# Patient Record
Sex: FEMALE | Race: ASIAN | NOT HISPANIC OR LATINO | URBAN - METROPOLITAN AREA
[De-identification: names, ages, dates, MRNs, and addresses within clinical notes are randomized per-mention and may not be internally consistent; named-entity substitution may affect disease eponyms.]

---

## 2019-03-20 ENCOUNTER — OFFICE (OUTPATIENT)
Dept: URBAN - METROPOLITAN AREA CLINIC 45 | Facility: CLINIC | Age: 49
End: 2019-03-20

## 2019-03-20 VITALS
HEIGHT: 60 IN | DIASTOLIC BLOOD PRESSURE: 61 MMHG | HEART RATE: 89 BPM | SYSTOLIC BLOOD PRESSURE: 103 MMHG | WEIGHT: 104 LBS

## 2019-03-20 DIAGNOSIS — R19.5 STOOL FLECKED WITH BLOOD: ICD-10-CM

## 2019-03-20 DIAGNOSIS — K62.89 PAIN, ANAL/RECTAL: ICD-10-CM

## 2019-03-20 DIAGNOSIS — K59.09 CONSTIPATION, OTHER: ICD-10-CM

## 2019-03-20 PROCEDURE — 99203 OFFICE O/P NEW LOW 30 MIN: CPT | Performed by: INTERNAL MEDICINE

## 2019-03-20 NOTE — SERVICEHPINOTES
The patient complains of discomfort/pain in the anorectal area.   Symptoms started   several  years   ago.   It is a   mild and moderate  ,  sharp and dull  ,   pain.    It is sensed by the patient in   perineum and anal verge   area.  It radiates to the   sacrum and back  .   Pain lasts   .   Associated symptoms include   .   Prior pertinent anorectal conditions for this patient include   .   The patient has previously had     for the treatment of related disease.    Patient has long-standing back pain problems.  She has been previously treated by pain management specialist.  Her symptoms recently prompted her primary care physician to proceed with CT scan of the abdomen, which reportedly described no major pathology. She now presents for discussion about colonoscopy. There has been no previous colon screening. The patient has no family history of colon cancer or other significant GI diseases. Patient reports intermittent constipation and small amount of bright red blood on toilet tissue. She denies fever, nausea, vomiting, dysphagia, reflux, abdominal pain, change in bowel habits, melena, and significant change in weight. Denies shortness of breath and chest pain.

## 2019-04-05 ENCOUNTER — AMBULATORY SURGICAL CENTER (OUTPATIENT)
Dept: URBAN - METROPOLITAN AREA SURGERY 28 | Facility: SURGERY | Age: 49
End: 2019-04-05

## 2019-04-05 VITALS
WEIGHT: 104 LBS | DIASTOLIC BLOOD PRESSURE: 66 MMHG | SYSTOLIC BLOOD PRESSURE: 98 MMHG | HEART RATE: 81 BPM | TEMPERATURE: 98.1 F | HEIGHT: 60 IN | OXYGEN SATURATION: 99 % | RESPIRATION RATE: 16 BRPM

## 2019-04-05 DIAGNOSIS — K59.09 OTHER CONSTIPATION: ICD-10-CM

## 2019-04-05 DIAGNOSIS — D12.5 BENIGN NEOPLASM OF SIGMOID COLON: ICD-10-CM

## 2019-04-05 DIAGNOSIS — K62.89 OTHER SPECIFIED DISEASES OF ANUS AND RECTUM: ICD-10-CM

## 2019-04-05 DIAGNOSIS — R19.5 OTHER FECAL ABNORMALITIES: ICD-10-CM

## 2019-04-05 DIAGNOSIS — D12.3 BENIGN NEOPLASM OF TRANSVERSE COLON: ICD-10-CM

## 2019-04-05 LAB — SURGICAL: PDF REPORT: (no result)

## 2019-04-05 PROCEDURE — 45380 COLONOSCOPY AND BIOPSY: CPT | Performed by: INTERNAL MEDICINE

## 2019-04-05 NOTE — SERVICEHPINOTES
The patient complains of discomfort/pain in the anorectal area. Symptoms started several years ago. It is a mild and moderate, sharp and dull, pain. It is sensed by the patient in perineum and anal verge area. It radiates to the sacrum and back. Patient has long-standing back pain problems. She has been previously treated by pain management specialist. Her symptoms recently prompted her primary care physician to proceed with CT scan of the abdomen, which reportedly described no major pathology. She now presents for discussion about colonoscopy. There has been no previous colon screening. The patient has no family history of colon cancer or other significant GI diseases. Patient reports intermittent constipation and small amount of bright red blood on toilet tissue. She denies fever, nausea, vomiting, dysphagia, reflux, abdominal pain, change in bowel habits, melena, and significant change in weight. Denies shortness of breath and chest pain.

## 2022-10-17 ENCOUNTER — HOSPITAL ENCOUNTER (EMERGENCY)
Dept: HOSPITAL 54 - ER | Age: 52
Discharge: HOME | End: 2022-10-17
Payer: COMMERCIAL

## 2022-10-17 VITALS — WEIGHT: 116 LBS | HEIGHT: 59 IN | BODY MASS INDEX: 23.39 KG/M2

## 2022-10-17 VITALS — DIASTOLIC BLOOD PRESSURE: 62 MMHG | SYSTOLIC BLOOD PRESSURE: 106 MMHG

## 2022-10-17 DIAGNOSIS — R10.2: Primary | ICD-10-CM

## 2022-10-17 DIAGNOSIS — Z90.89: ICD-10-CM

## 2022-10-17 LAB
ALBUMIN SERPL BCP-MCNC: 4.1 G/DL (ref 3.4–5)
ALP SERPL-CCNC: 70 U/L (ref 46–116)
ALT SERPL W P-5'-P-CCNC: 23 U/L (ref 12–78)
AST SERPL W P-5'-P-CCNC: 11 U/L (ref 15–37)
BASOPHILS # BLD AUTO: 0 K/UL (ref 0–0.2)
BASOPHILS NFR BLD AUTO: 0.4 % (ref 0–2)
BILIRUB DIRECT SERPL-MCNC: 0.1 MG/DL (ref 0–0.2)
BILIRUB SERPL-MCNC: 0.4 MG/DL (ref 0.2–1)
BILIRUB UR QL STRIP: NEGATIVE
BUN SERPL-MCNC: 15 MG/DL (ref 7–18)
CALCIUM SERPL-MCNC: 9.1 MG/DL (ref 8.5–10.1)
CHLORIDE SERPL-SCNC: 102 MMOL/L (ref 98–107)
CO2 SERPL-SCNC: 34 MMOL/L (ref 21–32)
COLOR UR: YELLOW
CREAT SERPL-MCNC: 1.1 MG/DL (ref 0.6–1.3)
DEPRECATED SQUAMOUS URNS QL MICRO: (no result) /HPF
EOSINOPHIL NFR BLD AUTO: 1.3 % (ref 0–6)
GLUCOSE SERPL-MCNC: 83 MG/DL (ref 74–106)
GLUCOSE UR STRIP-MCNC: NEGATIVE MG/DL
HCT VFR BLD AUTO: 42 % (ref 33–45)
HGB BLD-MCNC: 13.7 G/DL (ref 11.5–14.8)
LEUKOCYTE ESTERASE UR QL STRIP: NEGATIVE
LYMPHOCYTES NFR BLD AUTO: 2.4 K/UL (ref 0.8–4.8)
LYMPHOCYTES NFR BLD AUTO: 33.7 % (ref 20–44)
MCHC RBC AUTO-ENTMCNC: 33 G/DL (ref 31–36)
MCV RBC AUTO: 90 FL (ref 82–100)
MONOCYTES NFR BLD AUTO: 0.7 K/UL (ref 0.1–1.3)
MONOCYTES NFR BLD AUTO: 10.3 % (ref 2–12)
NEUTROPHILS # BLD AUTO: 3.8 K/UL (ref 1.8–8.9)
NEUTROPHILS NFR BLD AUTO: 54.3 % (ref 43–81)
NITRITE UR QL STRIP: NEGATIVE
PH UR STRIP: 6 [PH] (ref 5–8)
PLATELET # BLD AUTO: 248 K/UL (ref 150–450)
POTASSIUM SERPL-SCNC: 3.6 MMOL/L (ref 3.5–5.1)
PROT SERPL-MCNC: 7.8 G/DL (ref 6.4–8.2)
PROT UR QL STRIP: NEGATIVE MG/DL
RBC # BLD AUTO: 4.62 MIL/UL (ref 4–5.2)
RBC #/AREA URNS HPF: (no result) /HPF (ref 0–2)
SODIUM SERPL-SCNC: 138 MMOL/L (ref 136–145)
UROBILINOGEN UR STRIP-MCNC: 0.2 EU/DL
WBC #/AREA URNS HPF: (no result) /HPF (ref 0–3)
WBC NRBC COR # BLD AUTO: 7.1 K/UL (ref 4.3–11)

## 2022-10-17 NOTE — NUR
BIBS STATING SHE HAD A CT WITH ABNORMALITIES AND PCP REFFERED HER TO GET A 
PELVIC ULTRASOUND AND D-DIMER. PT STATED THAT SHE INITALLY WENT TO HER PCP 
BECAUSE SHE HAD CHEST PRESSURE THAT RADIATED TO HER LOWER STOMACH FOR 2 WEEKS. 
DENIES ANY RECENT TRAVEL. VITALS ARE WITHIN NORMAL LIMITS. AWAITING MD MUSA.

## 2022-11-14 ENCOUNTER — OFFICE (OUTPATIENT)
Dept: URBAN - METROPOLITAN AREA CLINIC 45 | Facility: CLINIC | Age: 52
End: 2022-11-14

## 2022-11-14 VITALS — HEIGHT: 60 IN

## 2022-11-14 DIAGNOSIS — Z86.010 PERSONAL HISTORY COLON POLYPS: ICD-10-CM

## 2022-11-14 DIAGNOSIS — R10.2 PELVIC PAIN: ICD-10-CM

## 2022-11-14 PROCEDURE — 99203 OFFICE O/P NEW LOW 30 MIN: CPT | Mod: 95 | Performed by: INTERNAL MEDICINE

## 2022-11-14 NOTE — SERVICEHPINOTES
The patient is scheduled today for a telehealth visit, due to current mandate for social distancing on account of the COVID-19 Pandemic. The clinician is connected to a secure network. The patient consents to this teleconference being a billable service.   This visit used [email or web address] for a live, interactive audio and video telehealth visit.   The patient complains of abdominal pain.    Symptoms began   several  month   ago with onset that was    abrupt  .    It is localized as   left lower quadrant   pain.    It is described as   moderate   in nature.   Pain quality is described as   crampy and dull  .    It also radiates to the   pelvic  .    Discomfort typically lasts   several  hours   and has a course which is   steady  .   It usually starts   abruptly  .    Symptom triggers include   nothing specific  .  Alleviating factors include   positional change/pressure  .    Symptoms have been    since onset.    Alarm features noted:   .   The patient also reports   .      Symptoms have caused the patient to   see primary care physician   Similar symptoms   have not   occurred in the past, associated with    pelvic floor issues  .   Noteworthy prior abdominal interventions include   .   Off note pain may occur up to 2-3 weeks apart  COLONOSCOPY 2019   has been performed previously to evaluate the patient's symptoms.   Remedies tried to date include    Tylenol   with    some benefit  .    Other pertinent testing to date includes,   CT abd /pelvis   Pt denies any change in bowel habit. Denies any blood in stool or black stool. Last colonoscopy with benign polyps. CT scan abd/pelvis possible pelvic congestion syndrome.Pt denies any recent fever, chills, wt loss, n/v.

## 2023-05-11 ENCOUNTER — OFFICE (OUTPATIENT)
Dept: URBAN - METROPOLITAN AREA CLINIC 45 | Facility: CLINIC | Age: 53
End: 2023-05-11

## 2023-05-11 VITALS — HEIGHT: 60 IN

## 2023-05-11 DIAGNOSIS — R13.10 DYSPHAGIA: ICD-10-CM

## 2023-05-11 DIAGNOSIS — F41.9 ANXIETY AND DEPRESSION: ICD-10-CM

## 2023-05-11 DIAGNOSIS — K21.9 GERD: ICD-10-CM

## 2023-05-11 PROCEDURE — 99214 OFFICE O/P EST MOD 30 MIN: CPT | Mod: 95 | Performed by: INTERNAL MEDICINE

## 2023-05-11 NOTE — SERVICEHPINOTES
The patient is scheduled today for a telehealth visit, due to current mandate for social distancing on account of the COVID-19 Pandemic. The clinician is connected to a secure network. The patient consents to this teleconference being a billable service.   This visit used [email or web address] for a live, interactive audio and video telehealth visit.   The patient is seen for the evaluation of suspected GERD.    Noted the onset of   heartburn, regurgitation and dysphagia  several  months   ago  .   Symptoms have been occurring   a few   time(s) per   week  .    During a given day, they are most prevalent   at random times of the day and upon awakening in the morning  .    They are exacerbated by   nothing specific and laying flat  .   In the past, the patient has tried   OTC H2 blockers  .   Currently takes   OTC H2 blockers and OTC antacids   dosed   every day  .   On this therapy, symptom response has been   partial  .    Associated symptoms include   nausea, vomiting and dysphagia  .      Has also noted atypical (non-esophageal) symptoms including   .    A prior EGD has been performed and showed   .   Has am n/v on occasion.The patient is seen for evaluation of dysphagia.    Symptoms started   several  months   ago.    Difficulty with swallowing has occurred   intermittently with solids  .      Symptoms have been   stable without progression or worsening  .    Food seems to get stuck in the   back of the mouth/throat and suprasternal area  .   Dysphagia occurs   at least once daily  .   Associated complaints include   frequent heartburn  .     Conditions relevant to this patient's dysphagia include   .    Prior treatment has included   .   To this point the patient has had    to evaluate this problem.    An upper endoscopy was last performed    ago.   Has lost some weight. No blood in stool or black stool. Last colon 2019, benign polyp, will be due 2024

## 2023-06-16 ENCOUNTER — AMBULATORY SURGICAL CENTER (OUTPATIENT)
Dept: URBAN - METROPOLITAN AREA SURGERY 41 | Facility: SURGERY | Age: 53
End: 2023-06-16

## 2023-06-16 VITALS
TEMPERATURE: 98 F | HEIGHT: 60 IN | OXYGEN SATURATION: 99 % | WEIGHT: 105 LBS | DIASTOLIC BLOOD PRESSURE: 73 MMHG | RESPIRATION RATE: 16 BRPM | SYSTOLIC BLOOD PRESSURE: 109 MMHG | HEART RATE: 65 BPM

## 2023-06-16 DIAGNOSIS — K21.9 GASTRO-ESOPHAGEAL REFLUX DISEASE WITHOUT ESOPHAGITIS: ICD-10-CM

## 2023-06-16 DIAGNOSIS — K22.89 OTHER SPECIFIED DISEASE OF ESOPHAGUS: ICD-10-CM

## 2023-06-16 DIAGNOSIS — K44.9 DIAPHRAGMATIC HERNIA WITHOUT OBSTRUCTION OR GANGRENE: ICD-10-CM

## 2023-06-16 DIAGNOSIS — K31.89 OTHER DISEASES OF STOMACH AND DUODENUM: ICD-10-CM

## 2023-06-16 LAB — SURGICAL: PDF REPORT: (no result)

## 2023-06-16 PROCEDURE — 43239 EGD BIOPSY SINGLE/MULTIPLE: CPT | Performed by: INTERNAL MEDICINE
